# Patient Record
Sex: MALE | Race: WHITE
[De-identification: names, ages, dates, MRNs, and addresses within clinical notes are randomized per-mention and may not be internally consistent; named-entity substitution may affect disease eponyms.]

---

## 2020-01-04 ENCOUNTER — HOSPITAL ENCOUNTER (EMERGENCY)
Dept: HOSPITAL 63 - ER | Age: 35
Discharge: HOME | End: 2020-01-04
Payer: SELF-PAY

## 2020-01-04 VITALS — WEIGHT: 197 LBS | HEIGHT: 72 IN | BODY MASS INDEX: 26.68 KG/M2

## 2020-01-04 VITALS — DIASTOLIC BLOOD PRESSURE: 76 MMHG | SYSTOLIC BLOOD PRESSURE: 139 MMHG

## 2020-01-04 DIAGNOSIS — J20.9: Primary | ICD-10-CM

## 2020-01-04 PROCEDURE — 99283 EMERGENCY DEPT VISIT LOW MDM: CPT

## 2020-01-04 NOTE — PHYS DOC
Adult General


Chief Complaint


Chief Complaint:  COUGH





HPI


HPI





Patient is a 34-year-old male who presents with complaint of productive cough, 

chest soreness, congestion and nasal drainage for the last 4 days. He states 

that he is getting some soreness in his chest. He denies any fever. He does 

indicate he has had a little bit of nausea but no vomiting or diarrhea.[]





Review of Systems


Review of Systems





Constitutional: Denies fever or chills []


HENT: Positive nasal congestion without sore throat []


Respiratory: Positive cough without shortness of breath []


Cardiovascular: No additional information not addressed in HPI []


GI: Denies abdominal pain, vomiting or diarrhea []


Integument: Denies rash or skin lesions []





Allergies


Allergies





Allergies








Coded Allergies Type Severity Reaction Last Updated Verified


 


  No Known Drug Allergies    1/4/20 No











Physical Exam


Physical Exam





Constitutional: Well developed, well nourished, no acute distress, non-toxic 

appearance. []


Cardiovascular: Regular rate and rhythm[]


Lungs & Thorax:  Bilateral breath sounds clear to auscultation []


Skin: Warm, dry, no erythema, no rash. [] 


Extremities: No tenderness, no cyanosis, no clubbing, ROM intact, no edema. [] 


Neurologic: Alert and oriented X 3, no focal deficits noted. []





EKG


EKG


[]





Radiology/Procedures


Radiology/Procedures


[]





Course & Med Decision Making


Course & Med Decision Making


Pertinent Labs and Imaging studies reviewed. (See chart for details)





[]





Dragon Disclaimer


Dragon Disclaimer


This electronic medical record was generated, in whole or in part, using a voice

 recognition dictation system.





Departure


Departure:


Impression:  


   Primary Impression:  


   Acute bronchitis


Disposition:  01 HOME, SELF-CARE


Condition:  STABLE


Referrals:  


DESTIN LONDONO DO (PCP)


Patient Instructions:  Acute Bronchitis


Scripts


Azithromycin (ZITHROMAX) 250 Mg Tablet


1 PKG PO UD for infection, #6 TAB


   Prov: RAGHAVENDRA CULVER Jr. DO         1/4/20





Problem Qualifiers








   Primary Impression:  


   Acute bronchitis


   Bronchitis organism:  unspecified organism  Qualified Codes:  J20.9 - Acute 

   bronchitis, unspecified








RAGHAVENDRA CULVER Jr. DO           Jan 4, 2020 10:47